# Patient Record
Sex: MALE | Race: WHITE | NOT HISPANIC OR LATINO | Employment: STUDENT | URBAN - METROPOLITAN AREA
[De-identification: names, ages, dates, MRNs, and addresses within clinical notes are randomized per-mention and may not be internally consistent; named-entity substitution may affect disease eponyms.]

---

## 2020-03-30 ENCOUNTER — OFFICE VISIT (OUTPATIENT)
Dept: URGENT CARE | Facility: CLINIC | Age: 25
End: 2020-03-30
Payer: COMMERCIAL

## 2020-03-30 VITALS — TEMPERATURE: 98 F | WEIGHT: 185 LBS | HEART RATE: 112 BPM | OXYGEN SATURATION: 97 %

## 2020-03-30 DIAGNOSIS — R30.0 DYSURIA: ICD-10-CM

## 2020-03-30 DIAGNOSIS — N30.01 ACUTE CYSTITIS WITH HEMATURIA: Primary | ICD-10-CM

## 2020-03-30 LAB
BILIRUB UR QL STRIP: NEGATIVE
GLUCOSE UR QL STRIP: NEGATIVE
KETONES UR QL STRIP: NEGATIVE
LEUKOCYTE ESTERASE UR QL STRIP: POSITIVE
PH, POC UA: 6 (ref 5–8)
POC BLOOD, URINE: POSITIVE
POC NITRATES, URINE: NEGATIVE
PROT UR QL STRIP: POSITIVE
SP GR UR STRIP: 1.02 (ref 1–1.03)
UROBILINOGEN UR STRIP-ACNC: NORMAL (ref 0.3–2.2)

## 2020-03-30 PROCEDURE — 99213 OFFICE O/P EST LOW 20 MIN: CPT | Mod: 25,S$GLB,, | Performed by: NURSE PRACTITIONER

## 2020-03-30 PROCEDURE — 81003 URINALYSIS AUTO W/O SCOPE: CPT | Mod: QW,S$GLB,, | Performed by: NURSE PRACTITIONER

## 2020-03-30 PROCEDURE — 99213 PR OFFICE/OUTPT VISIT, EST, LEVL III, 20-29 MIN: ICD-10-PCS | Mod: 25,S$GLB,, | Performed by: NURSE PRACTITIONER

## 2020-03-30 PROCEDURE — 87491 CHLMYD TRACH DNA AMP PROBE: CPT

## 2020-03-30 PROCEDURE — 81003 POCT URINALYSIS, DIPSTICK, AUTOMATED, W/O SCOPE: ICD-10-PCS | Mod: QW,S$GLB,, | Performed by: NURSE PRACTITIONER

## 2020-03-30 RX ORDER — CIPROFLOXACIN 500 MG/1
500 TABLET ORAL 2 TIMES DAILY
Qty: 20 TABLET | Refills: 0 | Status: SHIPPED | OUTPATIENT
Start: 2020-03-30 | End: 2020-04-09

## 2020-03-30 NOTE — PROGRESS NOTES
Subjective:       Patient ID: Lee Ramirez is a 24 y.o. male.    Vitals:  weight is 83.9 kg (185 lb). His temperature is 98.3 °F (36.8 °C). His pulse is 112 (abnormal). His oxygen saturation is 97%.     Chief Complaint: Urinary Tract Infection    Ambulatory with c/o fever and frequency for few days. He states he was treated for UTI approx. 1 week ago, provider didn't give enough medication so the infection didn't clear. Patient was treated with azithromycin 1 gm. He did not have a urine dip or cultures done and was treated by home visit medic. They did not give injections. He admits to unprotected sex.     Urinary Tract Infection    This is a recurrent problem. The current episode started in the past 7 days. The quality of the pain is described as burning. The pain is at a severity of 7/10. The maximum temperature recorded prior to his arrival was 101 - 101.9 F (101.5). The fever has been present for 1 - 2 days. He is sexually active. There is no history of pyelonephritis. Pertinent negatives include no chills, frequency, hematuria, nausea, urgency, vomiting or rash. Treatments tried: azo.       Constitution: Positive for fever. Negative for chills.   Neck: Negative for painful lymph nodes.   Gastrointestinal: Negative for abdominal pain, nausea and vomiting.   Genitourinary: Positive for dysuria and urine decreased. Negative for frequency, urgency, hematuria, history of kidney stones, genital trauma, painful intercourse, genital sore, penile discharge, painful ejaculation, penile pain, penile swelling, scrotal swelling and testicular pain.   Musculoskeletal: Negative for back pain.   Skin: Negative for rash and lesion.   Hematologic/Lymphatic: Negative for swollen lymph nodes.       Objective:      Physical Exam   Constitutional: He is oriented to person, place, and time. He appears well-developed and well-nourished. No distress.   HENT:   Head: Normocephalic and atraumatic.   Right Ear: External ear normal.    Left Ear: External ear normal.   Nose: Nose normal. No nasal deformity. No epistaxis.   Mouth/Throat: Oropharynx is clear and moist and mucous membranes are normal.   Eyes: Conjunctivae and lids are normal.   Neck: Trachea normal, normal range of motion and phonation normal. Neck supple.   Cardiovascular: Normal rate, regular rhythm, normal heart sounds and intact distal pulses.   Pulmonary/Chest: Effort normal and breath sounds normal.   Abdominal: Soft. Normal appearance and bowel sounds are normal. He exhibits no distension. There is no tenderness. There is no CVA tenderness.   Musculoskeletal: Normal range of motion.   Neurological: He is alert and oriented to person, place, and time. He has normal reflexes.   Skin: Skin is warm, dry, intact and not diaphoretic.   Psychiatric: He has a normal mood and affect. His speech is normal and behavior is normal. Judgment and thought content normal.   Nursing note and vitals reviewed.        Assessment:       1. Acute cystitis with hematuria    2. Dysuria        Plan:         Acute cystitis with hematuria  -     C. trachomatis/N. gonorrhoeae by AMP DNA  -     ciprofloxacin HCl (CIPRO) 500 MG tablet; Take 1 tablet (500 mg total) by mouth 2 (two) times daily. for 10 days  Dispense: 20 tablet; Refill: 0    Dysuria  -     POCT Urinalysis, Dipstick, Automated, W/O Scope  -     C. trachomatis/N. gonorrhoeae by AMP DNA  -     ciprofloxacin HCl (CIPRO) 500 MG tablet; Take 1 tablet (500 mg total) by mouth 2 (two) times daily. for 10 days  Dispense: 20 tablet; Refill: 0

## 2020-03-30 NOTE — PATIENT INSTRUCTIONS
"  Dysuria     Painful urination (dysuria) is often caused by a problem in the urinary tract.   Dysuria is pain felt during urination. It is often described as a burning. Learn more about this problem and how it can be treated.  What causes dysuria?  Possible causes include:  · Infection with a bacteria or virus such as a urinary tract infection (UTI or a sexually transmitted infection (STI)  · Sensitivity or allergy to chemicals such as those found in lotions and other products  · Prostate or bladder problems  · Radiation therapy to the pelvic area  How is dysuria diagnosed?  Your healthcare provider will examine you. He or she will ask about your symptoms and health. After talking with you and doing a physical exam, your healthcare provider may know what is causing your dysuria. He or she will usually request  a sample of your urine. Tests of your urine, or a "urinalysis," are done. A urinalysis may include:  · Looking at the urine sample (visual exam)  · Checking for substances (chemical exam)  · Looking at a small amount under a microscope (microscopic exam)  Some parts of the urinalysis may be done in the provider's office and some in a lab. And, the urine sample may be checked for bacteria and yeast (urine culture). Your healthcare provider will tell you more about these tests if they are needed.  How is dysuria treated?  Treatment depends on the cause. If you have a bacterial infection, you may need antibiotics. You may be given medicines to make it easier for you to urinate and help relieve pain. Your healthcare provider can tell you more about your treatment options. Untreated, symptoms may get worse.  When to call your healthcare provider  Call the healthcare provider right away if you have any of the following:  · Fever of 100.4°F (38°C) or higher   · No improvement after three days of treatment  · Trouble urinating because of pain  · New or increased discharge from the vagina or penis  · Rash or joint " pain  · Increased back or abdominal pain  · Enlarged painful lymph nodes (lumps) in the groin   Date Last Reviewed: 1/1/2017 © 2000-2017 [employer]. [address] [place] [address], [place] [address]. All rights reserved. This information is not intended as a substitute for professional medical care. Always follow your healthcare professional's instructions.        What is Hematuria?  Blood in your urine is a condition known as hematuria. Most of the time, the cause of hematuria is not serious. But, never ignore blood in the urine. Your doctor can evaluate you to find the cause of the bleeding and treat it, if needed.  Types of hematuria  · Gross hematuria means that the blood can be seen by the naked eye. The urine may look pinkish, brownish, or bright red.  · Microscopic hematuria means that the urine is clear, but blood cells can be seen when urine is looked at under a microscope or tested in a lab.  Both types of hematuria can have the same causes. Neither one is necessarily more serious than the other. With either type, you may have other symptoms, such as pain, pressure, or burning when you urinate, abdominal pain, or back pain. Or, you may not have any other symptoms. No matter how much blood is found, the cause of the bleeding needs to be identified.  Finding the cause of hematuria  To evaluate your condition, your doctor will first confirm that blood is indeed present. Then other tests are done to pinpoint where the blood is coming from and why. Your doctor will decide which tests will best determine the cause of your hematuria. Some common tests are listed below.  · History and physical exam  · Lab tests may include urinalysis, a urine culture, a urine cytology, and various blood tests  · Cystoscopy  · Computed tomography (CT) or CT urography  · Magnetic resonance imaging (MRI) or MR urography  · Ultrasound of the kidney  · Kidney biopsy  Causes of hematuria include the very benign (exercised  induced hematuria) to the very severe (cancer of the urinary system). A variety of treatments are available depending on the cause.  Date Last Reviewed: 12/2/2016  © 1195-4115 [employer]. [address] [place] [address], [place] [address]. All rights reserved. This information is not intended as a substitute for professional medical care. Always follow your healthcare professional's instructions.

## 2020-03-31 LAB
C TRACH DNA SPEC QL NAA+PROBE: NOT DETECTED
N GONORRHOEA DNA SPEC QL NAA+PROBE: NOT DETECTED

## 2020-04-03 ENCOUNTER — TELEPHONE (OUTPATIENT)
Dept: URGENT CARE | Facility: CLINIC | Age: 25
End: 2020-04-03

## 2020-04-03 NOTE — TELEPHONE ENCOUNTER
----- Message from Areli Pendleton MD sent at 4/1/2020  9:24 AM CDT -----  Please notify the test for gonorrhea and chlamydia were negative.  Call PCP should symptoms persist

## 2020-06-11 ENCOUNTER — OFFICE VISIT (OUTPATIENT)
Dept: INTERNAL MEDICINE | Facility: CLINIC | Age: 25
End: 2020-06-11
Payer: COMMERCIAL

## 2020-06-11 ENCOUNTER — TELEPHONE (OUTPATIENT)
Dept: INTERNAL MEDICINE | Facility: CLINIC | Age: 25
End: 2020-06-11

## 2020-06-11 VITALS
HEIGHT: 74 IN | OXYGEN SATURATION: 99 % | SYSTOLIC BLOOD PRESSURE: 90 MMHG | DIASTOLIC BLOOD PRESSURE: 60 MMHG | HEART RATE: 89 BPM | WEIGHT: 180 LBS | BODY MASS INDEX: 23.1 KG/M2

## 2020-06-11 DIAGNOSIS — Z00.00 ROUTINE GENERAL MEDICAL EXAMINATION AT A HEALTH CARE FACILITY: Primary | ICD-10-CM

## 2020-06-11 DIAGNOSIS — G40.909 SEIZURE DISORDER: ICD-10-CM

## 2020-06-11 PROCEDURE — 99999 PR PBB SHADOW E&M-EST. PATIENT-LVL III: ICD-10-PCS | Mod: PBBFAC,,, | Performed by: INTERNAL MEDICINE

## 2020-06-11 PROCEDURE — 99999 PR PBB SHADOW E&M-EST. PATIENT-LVL III: CPT | Mod: PBBFAC,,, | Performed by: INTERNAL MEDICINE

## 2020-06-11 PROCEDURE — 99385 PR PREVENTIVE VISIT,NEW,18-39: ICD-10-PCS | Mod: S$GLB,,, | Performed by: INTERNAL MEDICINE

## 2020-06-11 PROCEDURE — 99385 PREV VISIT NEW AGE 18-39: CPT | Mod: S$GLB,,, | Performed by: INTERNAL MEDICINE

## 2020-06-11 RX ORDER — OXCARBAZEPINE 600 MG/1
1200 TABLET, FILM COATED ORAL DAILY
COMMUNITY

## 2020-06-11 NOTE — TELEPHONE ENCOUNTER
----- Message from Donna Mcwilliams MD sent at 6/11/2020  1:36 PM CDT -----  Please co ntact neurology to see if someone can see him for his seizure disorder before he leaves to go back to school on June 23. If not, will need an apt around thanksgiving time  Donna Mcwilliams M.D.

## 2020-06-11 NOTE — PROGRESS NOTES
Chief Complaint: Establish care    HPI: this is a 24 year old man who presents for an initial visit for his annual exam.     He has a history of seizures. Started at age 11 (in 4th grade). Happen only when he forgets to take his medication or if he is sick, dehydration and lack of sleep.  Seizures only occur in his sleep (around 3-5 am).  Rarely has a seizure - 4-5 times a year.  When he has a seizure, he wakes up - he has a twitching of his head and mouth. No loss of consciousness. Lasts less than a minute.  He has trouble talking because the mouth is involved. HE can see and hear.  He has a mild hang over for a couple hours. No bladder or stool incontinence.  He takes Trileptal 600 mg 2 tablets daily for seizure disorder.  Seizures have been less frequent or less intense.     Supposedly the seizures started after swine flu when he was young.     Past Medical History:   Diagnosis Date    Seizures      Past Surgical history - none    Meds and allergies: updated on [facility]    Social history - Never smoked tobacco. Eats mariguana several times a week.  He has a beer a couple times a week.  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  Wants to do biomedical production.     Family History - Mother healthy. Father parkinsonism. Brother and Sister healthy. Maternal grandparents with alcoholism.     Review of Systems:  General: No fever, chills, night sweats, weight loss, fatigue  HEENT: No visual changes, hearing loss, sinus congestion, sore throat, post nasal drip  Resp:  No cough, shortness of breath, dyspnea on exertion  Cardiovascular:  No chest pain, palpitations, orthopnea, PND, edema  GI: No nausea, vomiting, constipation, diarrhea, melana, bloody stools, heartburn  : No dysuria, hematuria  Musculoskeletal: No joint pain, muscle pain  Neuro: No headaches, numbness  Endocrine: No polydipsia, polyuria, hot or cold intolerance.  Heme: No anemia, easy bruising.  Skin: No rashes,  "hairloss.  Psych: No anxiety, depression, or insomnia    Physical exam:  BP 90/60   Pulse 89   Ht 6' 2" (1.88 m)   Wt 81.7 kg (180 lb 0.1 oz)   SpO2 99%   BMI 23.11 kg/m²     General: alert, oriented x 3, no apparent distress.  Affect normal  HEENT: Conjunctivae: anicteric, PERRL, EOMI, TM clear, Oralpharynx clear  Neck: supple, no thyroid enlargement, no cervical lymphadenopathy  Resp: effort normal, lungs clear bilaterally  CV: Regular rate and rhythm without murmurs, gallops or rubs, no lower extremity edema,   Abdomen: soft, non-distended, non-tender, bowel sounds present, no hepatosplenomegaly.  Breasts: No abnormalities seen, no nodules palpated, no axillary lymphadenopathy  Skin: no rashes, good turgur    Assessment/Plan:    Annual exam - discussed diet, exercise and safety issues.    Seizure disorder - neurology referral.     Fasting labs.     Follow up in a year, sooner if problems.       "

## 2020-06-11 NOTE — Clinical Note
Please co ntact neurology to see if someone can see him for his seizure disorder before he leaves to go back to school on June 23. If not, will need an apt around thanksgiving Kervin Mcwilliams M.D.

## 2020-06-12 ENCOUNTER — TELEPHONE (OUTPATIENT)
Dept: NEUROLOGY | Facility: CLINIC | Age: 25
End: 2020-06-12

## 2020-06-12 ENCOUNTER — LAB VISIT (OUTPATIENT)
Dept: LAB | Facility: HOSPITAL | Age: 25
End: 2020-06-12
Attending: INTERNAL MEDICINE
Payer: COMMERCIAL

## 2020-06-12 DIAGNOSIS — Z00.00 ROUTINE GENERAL MEDICAL EXAMINATION AT A HEALTH CARE FACILITY: ICD-10-CM

## 2020-06-12 LAB
25(OH)D3+25(OH)D2 SERPL-MCNC: 29 NG/ML (ref 30–96)
ALBUMIN SERPL BCP-MCNC: 4.5 G/DL (ref 3.5–5.2)
ALP SERPL-CCNC: 65 U/L (ref 55–135)
ALT SERPL W/O P-5'-P-CCNC: 12 U/L (ref 10–44)
ANION GAP SERPL CALC-SCNC: 8 MMOL/L (ref 8–16)
AST SERPL-CCNC: 13 U/L (ref 10–40)
BASOPHILS # BLD AUTO: 0.02 K/UL (ref 0–0.2)
BASOPHILS NFR BLD: 0.5 % (ref 0–1.9)
BILIRUB SERPL-MCNC: 0.5 MG/DL (ref 0.1–1)
BUN SERPL-MCNC: 22 MG/DL (ref 6–20)
CALCIUM SERPL-MCNC: 9.9 MG/DL (ref 8.7–10.5)
CHLORIDE SERPL-SCNC: 103 MMOL/L (ref 95–110)
CHOLEST SERPL-MCNC: 156 MG/DL (ref 120–199)
CHOLEST/HDLC SERPL: 2.6 {RATIO} (ref 2–5)
CO2 SERPL-SCNC: 28 MMOL/L (ref 23–29)
CREAT SERPL-MCNC: 1 MG/DL (ref 0.5–1.4)
DIFFERENTIAL METHOD: ABNORMAL
EOSINOPHIL # BLD AUTO: 0.1 K/UL (ref 0–0.5)
EOSINOPHIL NFR BLD: 1.8 % (ref 0–8)
ERYTHROCYTE [DISTWIDTH] IN BLOOD BY AUTOMATED COUNT: 12 % (ref 11.5–14.5)
EST. GFR  (AFRICAN AMERICAN): >60 ML/MIN/1.73 M^2
EST. GFR  (NON AFRICAN AMERICAN): >60 ML/MIN/1.73 M^2
GLUCOSE SERPL-MCNC: 91 MG/DL (ref 70–110)
HCT VFR BLD AUTO: 40.9 % (ref 40–54)
HDLC SERPL-MCNC: 59 MG/DL (ref 40–75)
HDLC SERPL: 37.8 % (ref 20–50)
HGB BLD-MCNC: 13.7 G/DL (ref 14–18)
IMM GRANULOCYTES # BLD AUTO: 0.01 K/UL (ref 0–0.04)
IMM GRANULOCYTES NFR BLD AUTO: 0.3 % (ref 0–0.5)
LDLC SERPL CALC-MCNC: 87.4 MG/DL (ref 63–159)
LYMPHOCYTES # BLD AUTO: 1.4 K/UL (ref 1–4.8)
LYMPHOCYTES NFR BLD: 36.2 % (ref 18–48)
MCH RBC QN AUTO: 29.3 PG (ref 27–31)
MCHC RBC AUTO-ENTMCNC: 33.5 G/DL (ref 32–36)
MCV RBC AUTO: 88 FL (ref 82–98)
MONOCYTES # BLD AUTO: 0.4 K/UL (ref 0.3–1)
MONOCYTES NFR BLD: 9.1 % (ref 4–15)
NEUTROPHILS # BLD AUTO: 2.1 K/UL (ref 1.8–7.7)
NEUTROPHILS NFR BLD: 52.1 % (ref 38–73)
NONHDLC SERPL-MCNC: 97 MG/DL
NRBC BLD-RTO: 0 /100 WBC
PLATELET # BLD AUTO: 147 K/UL (ref 150–350)
PMV BLD AUTO: 10.8 FL (ref 9.2–12.9)
POTASSIUM SERPL-SCNC: 4.2 MMOL/L (ref 3.5–5.1)
PROT SERPL-MCNC: 7.4 G/DL (ref 6–8.4)
RBC # BLD AUTO: 4.67 M/UL (ref 4.6–6.2)
SODIUM SERPL-SCNC: 139 MMOL/L (ref 136–145)
TRIGL SERPL-MCNC: 48 MG/DL (ref 30–150)
TSH SERPL DL<=0.005 MIU/L-ACNC: 1.51 UIU/ML (ref 0.4–4)
VIT B12 SERPL-MCNC: 740 PG/ML (ref 210–950)
WBC # BLD AUTO: 3.95 K/UL (ref 3.9–12.7)

## 2020-06-12 PROCEDURE — 80061 LIPID PANEL: CPT

## 2020-06-12 PROCEDURE — 80053 COMPREHEN METABOLIC PANEL: CPT

## 2020-06-12 PROCEDURE — 84443 ASSAY THYROID STIM HORMONE: CPT

## 2020-06-12 PROCEDURE — 36415 COLL VENOUS BLD VENIPUNCTURE: CPT

## 2020-06-12 PROCEDURE — 85025 COMPLETE CBC W/AUTO DIFF WBC: CPT

## 2020-06-12 PROCEDURE — 82607 VITAMIN B-12: CPT

## 2020-06-12 PROCEDURE — 82306 VITAMIN D 25 HYDROXY: CPT

## 2020-12-14 ENCOUNTER — OFFICE VISIT (OUTPATIENT)
Dept: OTOLARYNGOLOGY | Facility: CLINIC | Age: 25
End: 2020-12-14
Payer: COMMERCIAL

## 2020-12-14 VITALS
WEIGHT: 185 LBS | TEMPERATURE: 98 F | DIASTOLIC BLOOD PRESSURE: 54 MMHG | HEART RATE: 92 BPM | SYSTOLIC BLOOD PRESSURE: 111 MMHG | BODY MASS INDEX: 23.74 KG/M2 | HEIGHT: 74 IN

## 2020-12-14 DIAGNOSIS — J30.9 ALLERGIC RHINITIS, UNSPECIFIED SEASONALITY, UNSPECIFIED TRIGGER: ICD-10-CM

## 2020-12-14 DIAGNOSIS — H61.21 IMPACTED CERUMEN OF RIGHT EAR: Primary | ICD-10-CM

## 2020-12-14 DIAGNOSIS — R19.6 HALITOSIS: ICD-10-CM

## 2020-12-14 DIAGNOSIS — J35.8 TONSILLITH: ICD-10-CM

## 2020-12-14 DIAGNOSIS — J34.2 NASAL SEPTAL DEVIATION: ICD-10-CM

## 2020-12-14 DIAGNOSIS — H69.93 DYSFUNCTION OF BOTH EUSTACHIAN TUBES: ICD-10-CM

## 2020-12-14 PROBLEM — H61.20 IMPACTED CERUMEN: Status: RESOLVED | Noted: 2020-12-14 | Resolved: 2020-12-14

## 2020-12-14 PROBLEM — H61.20 IMPACTED CERUMEN: Status: ACTIVE | Noted: 2020-12-14

## 2020-12-14 PROCEDURE — 99204 PR OFFICE/OUTPT VISIT, NEW, LEVL IV, 45-59 MIN: ICD-10-PCS | Mod: 25,S$GLB,, | Performed by: SPECIALIST

## 2020-12-14 PROCEDURE — 1126F PR PAIN SEVERITY QUANTIFIED, NO PAIN PRESENT: ICD-10-PCS | Mod: S$GLB,,, | Performed by: SPECIALIST

## 2020-12-14 PROCEDURE — 99204 OFFICE O/P NEW MOD 45 MIN: CPT | Mod: 25,S$GLB,, | Performed by: SPECIALIST

## 2020-12-14 PROCEDURE — 69210 PR REMOVAL IMPACTED CERUMEN REQUIRING INSTRUMENTATION, UNILATERAL: ICD-10-PCS | Mod: S$GLB,,, | Performed by: SPECIALIST

## 2020-12-14 PROCEDURE — 3008F BODY MASS INDEX DOCD: CPT | Mod: CPTII,S$GLB,, | Performed by: SPECIALIST

## 2020-12-14 PROCEDURE — 1126F AMNT PAIN NOTED NONE PRSNT: CPT | Mod: S$GLB,,, | Performed by: SPECIALIST

## 2020-12-14 PROCEDURE — 69210 REMOVE IMPACTED EAR WAX UNI: CPT | Mod: S$GLB,,, | Performed by: SPECIALIST

## 2020-12-14 PROCEDURE — 3008F PR BODY MASS INDEX (BMI) DOCUMENTED: ICD-10-PCS | Mod: CPTII,S$GLB,, | Performed by: SPECIALIST

## 2020-12-14 NOTE — PATIENT INSTRUCTIONS
Tonsillectomy, Post-Op Bleeding  You have had surgery to remove your tonsils. Bleeding at the surgery site can happen after surgery. The doctor can often control it using direct pressure or applying medicine to shrink the blood vessels. If this fails, you will need to return to the operating room for further treatment. Notify your doctor at once or return to this hospital if there are signs of any further bleeding.  Home Care  Your throat will be sore. Throat pain after surgery improves over the first 3-5 days. It is normal to have more pain at the end of the first week before it finally goes away.  Soft foods will be easier to swallow.  Drink plenty of fluids to prevent dehydration. You must continue to drink even though your throat hurts.  For pain relief, suck on ice cubes or frozen fruit pops, eat ice cream or sherbet, and drink cold liquids. You may also use liquid acetaminophen. Avoid taking ibuprofen or aspirin. These may increase bleeding risk. If your doctor has prescribed pain medication, take this as directed.   If antibiotics were prescribed, take these as directed until they are gone.  Do not overheat your home since this dries the air and may irritate throat tissues, especially at night. A cool-mist humidifier in the bedroom may help.  Avoid exposure to people with colds or the flu during the recovery period. You may be more likely to get ill during this time.  Smoking irritates the surgery site. If you smoke, this is a good time to stop.  Avoid any heavy exercise, lifting, or straining for the next 10 days.  Follow-up care  Follow up with your doctor or this facility as advised.  When to see medical advice  Call your doctor right away if any of the following occur:  Trouble speaking, swallowing, or breathing  Nausea and vomiting  Bleeding from the mouth  Fever over 100.4°F (38.3ºC)  Increasing pain not controlled by pain medications  Signs of dehydration: Very dark urine, less urine, dry mouth,  weakness  Date Last Reviewed: 4/20/2015  © 0764-4292 [employer]. [address] [place] [address], [place] [address]. All rights reserved. This information is not intended as a substitute for professional medical care. Always follow your healthcare professional's instructions.        Adult Tonsillectomy  The tonsils are 2 small masses of tissue at the back of the throat. They are part of the bodys immune system. This helps the body fight disease. In some people, the tonsils become infected or enlarged. This can cause severe sore throats, snoring, or other problems. Tonsillectomy is surgery to remove the tonsils. Tonsillectomy may be recommended if you have obstruction causing sleep apnea, or recurring, chronic, or severe infections. This sheet tells you more about this surgery and what to expect.    Preparing for surgery  Prepare as you have been told. Tell your healthcare provider about all medicine you take. This includes over-the-counter drugs. It also includes herbs and other supplements. You may need to stop taking some or all of them before surgery as directed by your healthcare provider. Also, follow any directions youre given for not eating or drinking before surgery.  The day of surgery  The surgery takes about 60 minutes. You will likely go home on the same day.  Before the surgery  Here is what to expect before the surgery begins:  · An IV line is put into a vein in your arm or hand. This line supplies fluids and medicines.  · To keep you free of pain during the surgery, youre given general anesthesia. This medicine puts you into a state like deep sleep through the surgery.  During the surgery  Here is what to expect during the surgery:  · A special device is used to keep the mouth open.  · Other tools are used to remove the tonsils from the back of the throat. The tissue is taken out through the mouth.  · The device holding the mouth open is then removed.  After the surgery  You will be taken to  a recovery room. Healthcare staff will make sure you can drink some liquids. They will also make sure your pain is being managed. When you are ready to leave the hospital, you will need to be driven home by an adult family member or friend.  Recovering at home  It will likely take about 2 weeks to heal from the surgery. During your recovery:  · Expect to have throat pain. You may also feel pain in your ears. This is referred pain from the throat, and is normal. Your post-surgery pain may come and go. It may be worse on the 4th or 5th day after surgery.  · Talk as little as possible, if it is painful.  · Take pain medicine as directed.  · Do not drive while you are on opioid or narcotic pain medicine. Expect to feel sleepy or dizzy while you are taking this medicine.  · Do not use ibuprofen or aspirin for 14 days after surgery unless your healthcare provider says its OK. You may use acetaminophen as directed.  · Use 2 or 3 pillows under your head while resting. This will help keep swelling down.  · Drink lots of chilled liquids. Water, noncitrus juices, and frozen juice bars are good choices.  · Eat cold foods and soft foods, which are easiest to swallow. Try foods like ice cream, gelatin, scrambled eggs, pasta, and mashed potatoes.  · Avoid foods that require a lot of chewing. Also avoid foods that may scratch the throat, like toast or potato chips. Avoid hot, spicy, or acidic foods.  · Avoid strenuous activity for 2 to 3 weeks after surgery.  · Be aware that white patches will form in the throat during healing. These are scabs and are not a sign of infection. The patches will come off in 1 to 2 weeks and may cause bleeding. To minimize bleeding, drink lots of fluids. Gargling with cold water can help.  Call the healthcare provider  Call your healthcare provider if you have any of the following:  · Chest pain or trouble breathing (call 911)  · Fever of 100.4°F (38°C) or higher, or as directed by your healthcare  provider  · Bright red bleeding from the mouth or nose  · Severe pain not relieved by medicine  · Signs of dehydration (dark urine, urinating less often)  · Heavy or persistent bleeding in the throat at any time  · Other signs or symptoms as indicated by your healthcare provider   Follow-up  Schedule a follow-up visit with your healthcare provider as advised. During this visit, the healthcare provider will make sure you are healing well. Ask any questions you have about the surgery or your recovery.  Risks and possible complications   Risks of this surgery include:  · Infection  · Bleeding  · Injury to the lips or teeth  · Painful swallowing during recovery  · The need for a second surgery  · Risks of anesthesia    Date Last Reviewed: 6/11/2015  © 2721-5677 [employer] [address] [place] [address], [place] [address]. All rights reserved. This information is not intended as a substitute for professional medical care. Always follow your healthcare professional's instructions.

## 2020-12-14 NOTE — PROGRESS NOTES
Subjective:       Patient ID: Lee Ramirez is a 25 y.o. male.    Chief Complaint: Right Ear infection/Ear Drum closed (with Throat hair string)    The patient is having multiple problems.  1..  He is having nasal congestion and postnasal drip with some pressure in his ear.  He has past history of spontaneous perforation secondary acute otitis media 1 year ago.  The right ear is completely blocked but not painful.  He is not having any drainage.  He has noticed decreased hearing in the ear.  He has been taking Claritin.  2.  To:  The throat:  He does have some tickling in his throat which is causing him to cough.  He recently cough doubt 40 thought was a piece of hair which cause the tickling to decrease.  3.  He does have cryptic tonsils and produces frequent tonsilliths.  He is not having a sore throat at moment.        Review of Systems     Constitutional: Negative for appetite change, chills, fatigue, fever and unexpected weight loss.      HENT: Positive for postnasal drip, runny nose, sinus pressure, sore throat and stuffy nose.  Negative for ear discharge, ear infection, ear pain, facial swelling, hearing loss, mouth sores, nosebleeds, ringing in the ears, sinus infection, tonsil infection (Cryptic tonsils with tonsilliths), dental problems, trouble swallowing and voice change.      Eyes:  Negative for change in eyesight, eye drainage, eye itching and photophobia.     Respiratory:  Positive for cough. Negative for shortness of breath, sleep apnea, snoring and wheezing.      Cardiovascular:  Negative for chest pain, foot swelling, irregular heartbeat and swollen veins.     Gastrointestinal:  Negative for abdominal pain, acid reflux, constipation, diarrhea, heartburn and vomiting.     Genitourinary: Negative for difficulty urinating, sexual problems and frequent urination.     Musc: Negative for aching joints, aching muscles, back pain and neck pain.     Skin: Negative for rash.     Allergy: Positive for  seasonal allergies. Negative for food allergies.     Endocrine: Negative for cold intolerance and heat intolerance.      Neurological: Positive for headaches. Negative for dizziness, light-headedness, seizures and tremors.      Hematologic: Negative for bruises/bleeds easily.      Psychiatric: Negative for decreased concentration, depression, nervous/anxious and sleep disturbance.                Objective:      Physical Exam  Vitals signs and nursing note reviewed.   Constitutional:       General: He is awake.      Appearance: Normal appearance. He is well-developed, well-groomed and normal weight.   HENT:      Head: Normocephalic.      Jaw: There is normal jaw occlusion.      Salivary Glands: Right salivary gland is not diffusely enlarged. Left salivary gland is not diffusely enlarged.      Right Ear: Hearing, ear canal and external ear normal. There is impacted cerumen. Tympanic membrane is retracted. Tympanic membrane has decreased mobility.      Left Ear: Hearing, ear canal and external ear normal. Tympanic membrane is retracted. Tympanic membrane has decreased mobility.      Nose: Septal deviation (Septum deviated to the right), mucosal edema (with inflamed turbinates bilaterall) and rhinorrhea (Clear mucus bilaterally) present. No nasal deformity.      Right Turbinates: Enlarged and pale. Not swollen.      Left Turbinates: Enlarged. Not swollen.      Mouth/Throat:      Lips: No lesions.      Mouth: No oral lesions.      Dentition: No gum lesions.      Palate: No mass and lesions.      Pharynx: Uvula midline. No oropharyngeal exudate ( ) or posterior oropharyngeal erythema ( Mild).      Tonsils: No tonsillar exudate ( cryptic with no exudate or tonsilloliths). 3+ on the right. 3+ on the left.   Eyes:      General: Lids are normal.         Right eye: No discharge.         Left eye: No discharge.      Conjunctiva/sclera:      Right eye: Right conjunctiva is not injected.      Left eye: Left conjunctiva is not  injected.      Pupils: Pupils are equal, round, and reactive to light.   Neck:      Musculoskeletal: Full passive range of motion without pain, normal range of motion and neck supple. No neck rigidity.      Thyroid: No thyroid mass or thyromegaly.      Vascular: No carotid bruit.      Trachea: Trachea and phonation normal.   Cardiovascular:      Rate and Rhythm: Normal rate and regular rhythm.      Heart sounds: Normal heart sounds.   Pulmonary:      Effort: No respiratory distress.      Breath sounds: Decreased breath sounds ( Diffusely) present. No wheezing, rhonchi or rales.   Abdominal:      General: Bowel sounds are normal.      Palpations: Abdomen is soft.      Tenderness: There is no abdominal tenderness.   Musculoskeletal: Normal range of motion.      Right shoulder: Normal.   Lymphadenopathy:      Head:      Right side of head: No occipital adenopathy.      Left side of head: No occipital adenopathy.      Cervical: Cervical adenopathy present.      Right cervical: Superficial cervical adenopathy and deep cervical adenopathy present. No posterior cervical adenopathy.     Left cervical: Superficial cervical adenopathy and deep cervical adenopathy present. No posterior cervical adenopathy.   Skin:     General: Skin is warm and dry.      Findings: No lesion, petechiae or rash.      Nails: There is no clubbing.     Neurological:      Mental Status: He is alert and oriented to person, place, and time.      Cranial Nerves: No cranial nerve deficit.      Sensory: No sensory deficit.      Gait: Gait normal.   Psychiatric:         Speech: Speech normal.         Behavior: Behavior normal. Behavior is cooperative.         Procedure-cerumen impaction was removed from the right ear using wire loop an 8 Welsh suction.  The patient tolerated procedure well was discharged post procedure      Assessment:       1. Impacted cerumen of right ear    2. Allergic rhinitis, unspecified seasonality, unspecified trigger    3.  Dysfunction of both eustachian tubes    4. Nasal septal deviation    5. Tonsillith    6. Halitosis        Plan:       I am advising patient to use Claritin on an as-needed basis.  He should gargle with warm salt water at least once weekly using 6 oz and gargling once it but it time.  We discuss the options regarding his cryptic tonsils and tonsilliths that are causing halitosis.  He can gargle and use mechanical debridement or have his tonsils removed.  I have been point in that it will require mandatory 2 weeks of down time to recover from the tonsillectomy.  I am giving him an instruction sheet about adult tonsillectomy.  I will recheck him on an as-needed basis.  He will be leaving to return to college in [place] in 3 weeks, so he would not be a candidate to have his tonsils removed now

## 2020-12-15 ENCOUNTER — OFFICE VISIT (OUTPATIENT)
Dept: DERMATOLOGY | Facility: CLINIC | Age: 25
End: 2020-12-15
Payer: COMMERCIAL

## 2020-12-15 DIAGNOSIS — D22.9 NEVUS: Primary | ICD-10-CM

## 2020-12-15 DIAGNOSIS — L72.0 EPIDERMAL INCLUSION CYST: ICD-10-CM

## 2020-12-15 DIAGNOSIS — D48.5 NEOPLASM OF UNCERTAIN BEHAVIOR OF SKIN: ICD-10-CM

## 2020-12-15 PROCEDURE — 88305 TISSUE EXAM BY PATHOLOGIST: CPT | Performed by: PATHOLOGY

## 2020-12-15 PROCEDURE — 99999 PR PBB SHADOW E&M-EST. PATIENT-LVL III: CPT | Mod: PBBFAC,,, | Performed by: DERMATOLOGY

## 2020-12-15 PROCEDURE — 10060 I&D ABSCESS SIMPLE/SINGLE: CPT | Mod: 59,S$GLB,, | Performed by: DERMATOLOGY

## 2020-12-15 PROCEDURE — 88305 TISSUE EXAM BY PATHOLOGIST: CPT | Mod: 26,,, | Performed by: PATHOLOGY

## 2020-12-15 PROCEDURE — 99999 PR PBB SHADOW E&M-EST. PATIENT-LVL III: ICD-10-PCS | Mod: PBBFAC,,, | Performed by: DERMATOLOGY

## 2020-12-15 PROCEDURE — 99202 PR OFFICE/OUTPT VISIT, NEW, LEVL II, 15-29 MIN: ICD-10-PCS | Mod: 25,S$GLB,, | Performed by: DERMATOLOGY

## 2020-12-15 PROCEDURE — 87070 CULTURE OTHR SPECIMN AEROBIC: CPT

## 2020-12-15 PROCEDURE — 12031 INTMD RPR S/A/T/EXT 2.5 CM/<: CPT | Mod: S$GLB,,, | Performed by: DERMATOLOGY

## 2020-12-15 PROCEDURE — 87147 CULTURE TYPE IMMUNOLOGIC: CPT

## 2020-12-15 PROCEDURE — 11402 EXC TR-EXT B9+MARG 1.1-2 CM: CPT | Mod: S$GLB,,, | Performed by: DERMATOLOGY

## 2020-12-15 PROCEDURE — 1126F PR PAIN SEVERITY QUANTIFIED, NO PAIN PRESENT: ICD-10-PCS | Mod: S$GLB,,, | Performed by: DERMATOLOGY

## 2020-12-15 PROCEDURE — 88305 TISSUE EXAM BY PATHOLOGIST: ICD-10-PCS | Mod: 26,,, | Performed by: PATHOLOGY

## 2020-12-15 PROCEDURE — 12031 PR LAYR CLOS WND TRUNK,ARM,LEG <2.5 CM: ICD-10-PCS | Mod: S$GLB,,, | Performed by: DERMATOLOGY

## 2020-12-15 PROCEDURE — 11402 PR EXC SKIN BENIG 1.1-2 CM TRUNK,ARM,LEG: ICD-10-PCS | Mod: S$GLB,,, | Performed by: DERMATOLOGY

## 2020-12-15 PROCEDURE — 1126F AMNT PAIN NOTED NONE PRSNT: CPT | Mod: S$GLB,,, | Performed by: DERMATOLOGY

## 2020-12-15 PROCEDURE — 10060 PR DRAIN SKIN ABSCESS SIMPLE: ICD-10-PCS | Mod: 59,S$GLB,, | Performed by: DERMATOLOGY

## 2020-12-15 PROCEDURE — 99202 OFFICE O/P NEW SF 15 MIN: CPT | Mod: 25,S$GLB,, | Performed by: DERMATOLOGY

## 2020-12-15 RX ORDER — DOXYCYCLINE 100 MG/1
1 TABLET ORAL 2 TIMES DAILY
Qty: 20 TABLET | Refills: 0 | Status: SHIPPED | OUTPATIENT
Start: 2020-12-15 | End: 2020-12-25

## 2020-12-15 NOTE — PROGRESS NOTES
Subjective:       Patient ID:  Lee Ramirez is a 25 y.o. male who presents for   Chief Complaint   Patient presents with    Cyst     posterior right ear lobe    Mole     back     Cyst - Initial  Affected locations: right ear  Duration: 6 months  Signs / symptoms: tender  Severity: mild  Timing: constant  Aggravated by: pressure and friction  Relieving factors/Treatments tried: OTC acne medication (Differin)  Improvement on treatment: mild    Mole - Initial  Affected locations: back  Duration: 5 years  Signs / symptoms: growing and tender  Severity: mild  Timing: recurrent (removed once in the past)  Aggravated by: nothing  Relieving factors/Treatments tried: nothing        Review of Systems   Skin: Positive for activity-related sunscreen use and wears hat. Negative for daily sunscreen use and recent sunburn.   Hematologic/Lymphatic: Does not bruise/bleed easily.        Objective:    Physical Exam   Constitutional: He appears well-developed and well-nourished.   Neurological: He is alert and oriented to person, place, and time.   Psychiatric: He has a normal mood and affect.   Skin:   Areas Examined (abnormalities noted in diagram):   Head / Face Inspection Performed  Back Inspection Performed                   Diagram Legend     Erythematous scaling macule/papule c/w actinic keratosis       Vascular papule c/w angioma      Pigmented verrucoid papule/plaque c/w seborrheic keratosis      Yellow umbilicated papule c/w sebaceous hyperplasia      Irregularly shaped tan macule c/w lentigo     1-2 mm smooth white papules consistent with Milia      Movable subcutaneous cyst with punctum c/w epidermal inclusion cyst      Subcutaneous movable cyst c/w pilar cyst      Firm pink to brown papule c/w dermatofibroma      Pedunculated fleshy papule(s) c/w skin tag(s)      Evenly pigmented macule c/w junctional nevus     Mildly variegated pigmented, slightly irregular-bordered macule c/w mildly atypical nevus      Flesh  colored to evenly pigmented papule c/w intradermal nevus       Pink pearly papule/plaque c/w basal cell carcinoma      Erythematous hyperkeratotic cursted plaque c/w SCC      Surgical scar with no sign of skin cancer recurrence      Open and closed comedones      Inflammatory papules and pustules      Verrucoid papule consistent consistent with wart     Erythematous eczematous patches and plaques     Dystrophic onycholytic nail with subungual debris c/w onychomycosis     Umbilicated papule    Erythematous-base heme-crusted tan verrucoid plaque consistent with inflamed seborrheic keratosis     Erythematous Silvery Scaling Plaque c/w Psoriasis     See annotation              Assessment / Plan:      Pathology Orders:     Normal Orders This Visit    Specimen to Pathology, Dermatology     Comments:    Number of Specimens:->1  ------------------------->-------------------------  Spec 1 Procedure:->Biopsy  Spec 1 Clinical Impression:->r/o irr idn (reoccurance patient  had it previously biopsied)  Spec 1 Source:->Left mid back    Questions:    Procedure Type: Dermatology and skin neoplasms    Number of Specimens: 1    ------------------------: -------------------------    Spec 1 Procedure: Biopsy    Spec 1 Clinical Impression: r/o irr idn (reoccurance patient had it previously biopsied)    Spec 1 Source: Left mid back        Nevus  -     Specimen to Pathology, Dermatology  EXCISION- (DERM-EXCISION)  PLAN NOTES:  CONSENT: The purpose of the excision as well as potential risks were explained to the patient and written informed consent was obtained.   INDICATION and LOCATION:  Left mid back, irritated and reoccurance  PRE-OP LESION SIZE: 1.1 cm  The site was prepped with chlorhexidine 4% and draped in a sterile fashion. Local anesthesia was achieved using lidocaine 1% with epinephrine. A fusiform figure was drawn around the lesion with minimal borders of 4 mm and oriented in a manner appropriate to skin tension lines. An  incision was then made along the marked lines and carried down to subcutaneous tissue. The lesion was excised along this plane. Wound edges were undermined by sharp dissection. Hemostasis was achieved by electrosurgical cautery  Because of wound tension and the size, depth and location of the defect, a layered closure (intermediate repair) was required. This was accomplished with inverted, buried sutures of 4-0 Moncryl. Wound edges were approximated by a running suture of 4-0 Prolene. A sterile dressing was applied. The patient tolerated the procedure well and there were no complications. Blood loss was not significant.  POST-OP SIZE: 2 cm  DISCHARGE INSTRUCTIONS: The patient was instructed to keep the pressure bandage in place for 24 hours.  After 24 hours the area can be cleaned with soap and water.  Apply petrolatum-based ointment and bandage daily until suture removal visit.  The patient was instructed to call the clinic if any signs of infection develop, such as enlarging area of redness, swelling, increasing pain or purulent drainage. The patient was given an appointment to return to clinic for suture removal and pathology report.        Neoplasm of uncertain behavior of skin   as above benign nevus    Epidermal inclusion cyst  -     doxycycline monohydrate 100 mg Tab; Take 1 tablet (100 mg total) by mouth 2 (two) times daily. for 10 days  Dispense: 20 tablet; Refill: 0  -     Aerobic culture  Lesion incised with #11 blade and drained on today's date. Bacterial culture performed.    Patient instructed to perform warm compresses 2 - 3 times/daily.    Will remove on December 23rd after abx are taken               No follow-ups on file.

## 2020-12-15 NOTE — PATIENT INSTRUCTIONS
Doxycycline may cause GI discomfort, esophageal irritation/ulceration, and increased sun sensitivity. Patient was counseled to take medicine with meals and at least 1 hour before lying down.     For ear:  Patient instructed to perform warm compresses 2 - 3 times/daily.     Shave Biopsy Wound Care    Your doctor has performed a shave biopsy today.  A band aid and vaseline ointment has been placed over the site.  This should remain in place for 24 hours.  It is recommended that you keep the area dry for the first 24 hours.  After 24 hours, you may remove the band aid and wash the area with warm soap and water and apply Vaseline jelly.  Many patients prefer to use Neosporin or Bacitracin ointment.  This is acceptable; however, know that you can develop an allergy to this medication even if you have used it safely for years.  It is important to keep the area moist.  Letting it dry out and get air slows healing time, and will worsen the scar.  Band aid is optional after first 24 hours.      If you notice increasing redness, tenderness, pain, or yellow drainage at the biopsy site, please notify your doctor.  These are signs of an infection.    If your biopsy site is bleeding, apply firm pressure for 15 minutes straight.  Repeat for another 15 minutes, if it is still bleeding.   If the surgical site continues to bleed, then please contact your doctor.      For [facility] users:   You will receive your biopsy results in [facility] as soon as they are available. Please be assured that your physician/provider will review your results and will then determine what further treatment, evaluation, or planning is required. You should be contacted by your physician's/provider's office within 5 business days of receiving your results; If not, please reach out to directly. This is one more way [facility] is putting you first.     [address] [facility] [place] [address]/ [phone] FAX/ [email or web address]

## 2020-12-16 NOTE — PROCEDURES
"Ear Cerumen Removal    Date/Time: 12/14/2020 1:30 PM  Performed by: KYRA Macias MD  Authorized by: KYRA Macias MD     Time out: Immediately prior to procedure a "time out" was called to verify the correct patient, procedure, equipment, support staff and site/side marked as required.    Consent Done?:  Yes (Verbal)    Local anesthetic:  None  Location details:  Right ear  Procedure type comment:  Wire loop an 8 Danish suction  Cerumen  Removal Results:  Cerumen completely removed  Patient tolerance:  Patient tolerated the procedure well with no immediate complications      "

## 2020-12-17 LAB
BACTERIA SPEC AEROBE CULT: ABNORMAL
FINAL PATHOLOGIC DIAGNOSIS: NORMAL
GROSS: NORMAL
MICROSCOPIC EXAM: NORMAL

## 2020-12-21 DIAGNOSIS — L72.0 EPIDERMAL INCLUSION CYST: Primary | ICD-10-CM

## 2020-12-21 RX ORDER — AMOXICILLIN 500 MG/1
500 CAPSULE ORAL EVERY 12 HOURS
Qty: 20 CAPSULE | Refills: 0 | Status: SHIPPED | OUTPATIENT
Start: 2020-12-21 | End: 2020-12-31

## 2020-12-23 ENCOUNTER — OFFICE VISIT (OUTPATIENT)
Dept: DERMATOLOGY | Facility: CLINIC | Age: 25
End: 2020-12-23
Payer: COMMERCIAL

## 2020-12-23 DIAGNOSIS — L72.0 EPIDERMAL INCLUSION CYST: Primary | ICD-10-CM

## 2020-12-23 DIAGNOSIS — L90.5 SCAR: ICD-10-CM

## 2020-12-23 PROCEDURE — 88341 IMHCHEM/IMCYTCHM EA ADD ANTB: CPT | Mod: 26,,, | Performed by: PATHOLOGY

## 2020-12-23 PROCEDURE — 11442 PR EXC SKIN BENIG 1.1-2 CM FACE,FACIAL: ICD-10-PCS | Mod: 79,S$GLB,, | Performed by: DERMATOLOGY

## 2020-12-23 PROCEDURE — 88304 TISSUE EXAM BY PATHOLOGIST: CPT | Mod: 26,,, | Performed by: PATHOLOGY

## 2020-12-23 PROCEDURE — 99999 PR PBB SHADOW E&M-EST. PATIENT-LVL II: ICD-10-PCS | Mod: PBBFAC,,, | Performed by: DERMATOLOGY

## 2020-12-23 PROCEDURE — 99999 PR PBB SHADOW E&M-EST. PATIENT-LVL II: CPT | Mod: PBBFAC,,, | Performed by: DERMATOLOGY

## 2020-12-23 PROCEDURE — 11442 EXC FACE-MM B9+MARG 1.1-2 CM: CPT | Mod: 79,S$GLB,, | Performed by: DERMATOLOGY

## 2020-12-23 PROCEDURE — 88342 CHG IMMUNOCYTOCHEMISTRY: ICD-10-PCS | Mod: 26,,, | Performed by: PATHOLOGY

## 2020-12-23 PROCEDURE — 88341 IMHCHEM/IMCYTCHM EA ADD ANTB: CPT | Performed by: PATHOLOGY

## 2020-12-23 PROCEDURE — 88342 IMHCHEM/IMCYTCHM 1ST ANTB: CPT | Performed by: PATHOLOGY

## 2020-12-23 PROCEDURE — 88342 IMHCHEM/IMCYTCHM 1ST ANTB: CPT | Mod: 26,,, | Performed by: PATHOLOGY

## 2020-12-23 PROCEDURE — 88304 TISSUE EXAM BY PATHOLOGIST: CPT | Performed by: PATHOLOGY

## 2020-12-23 PROCEDURE — 88341 PR IHC OR ICC EACH ADD'L SINGLE ANTIBODY  STAINPR: ICD-10-PCS | Mod: 26,,, | Performed by: PATHOLOGY

## 2020-12-23 PROCEDURE — 99499 UNLISTED E&M SERVICE: CPT | Mod: S$GLB,,, | Performed by: DERMATOLOGY

## 2020-12-23 PROCEDURE — 88304 PR  SURG PATH,LEVEL III: ICD-10-PCS | Mod: 26,,, | Performed by: PATHOLOGY

## 2020-12-23 PROCEDURE — 99499 NO LOS: ICD-10-PCS | Mod: S$GLB,,, | Performed by: DERMATOLOGY

## 2020-12-23 NOTE — PROGRESS NOTES
Subjective:       Patient ID:  Lee Ramirez is a 25 y.o. male who presents for   Chief Complaint   Patient presents with    Cyst     ear      Cyst - Follow-up  Symptom course: unchanged  Affected locations: right ear  Signs / symptoms: asymptomatic  Severity: mild        Review of Systems   Constitutional: Negative for fever, chills and fatigue.   Skin: Positive for daily sunscreen use and wears hat. Negative for recent sunburn.   Hematologic/Lymphatic: Does not bruise/bleed easily.        Objective:    Physical Exam   Constitutional: He appears well-developed and well-nourished.   Neurological: He is alert and oriented to person, place, and time.   Psychiatric: He has a normal mood and affect.   Skin:   Areas Examined (abnormalities noted in diagram):   Head / Face Inspection Performed             Diagram Legend     Erythematous scaling macule/papule c/w actinic keratosis       Vascular papule c/w angioma      Pigmented verrucoid papule/plaque c/w seborrheic keratosis      Yellow umbilicated papule c/w sebaceous hyperplasia      Irregularly shaped tan macule c/w lentigo     1-2 mm smooth white papules consistent with Milia      Movable subcutaneous cyst with punctum c/w epidermal inclusion cyst      Subcutaneous movable cyst c/w pilar cyst      Firm pink to brown papule c/w dermatofibroma      Pedunculated fleshy papule(s) c/w skin tag(s)      Evenly pigmented macule c/w junctional nevus     Mildly variegated pigmented, slightly irregular-bordered macule c/w mildly atypical nevus      Flesh colored to evenly pigmented papule c/w intradermal nevus       Pink pearly papule/plaque c/w basal cell carcinoma      Erythematous hyperkeratotic cursted plaque c/w SCC      Surgical scar with no sign of skin cancer recurrence      Open and closed comedones      Inflammatory papules and pustules      Verrucoid papule consistent consistent with wart     Erythematous eczematous patches and plaques     Dystrophic  onycholytic nail with subungual debris c/w onychomycosis     Umbilicated papule    Erythematous-base heme-crusted tan verrucoid plaque consistent with inflamed seborrheic keratosis     Erythematous Silvery Scaling Plaque c/w Psoriasis     See annotation      Assessment / Plan:      Pathology Orders:     Normal Orders This Visit    Specimen to Pathology, Dermatology     Questions:    Procedure Type: Dermatology and skin neoplasms    Number of Specimens: 1    ------------------------: -------------------------    Spec 1 Procedure: Biopsy    Spec 1 Clinical Impression: r/o cyst vs. congealed blood vs. cyst contents with gran inflam    Spec 1 Source: right posterior ear lobe        Epidermal inclusion cyst  -     Specimen to Pathology, Dermatology  EXCISION- CYST     CONSENT: The purpose of the excision as well as potential risks were explained to the patient and written informed consent was obtained.      INDICATION and LOCATION: Likely epidermal inclusion cyst, right posterior ear lobe    PRE-OP LESION SIZE: 1.0 cm x 1.1cm    PROCEDURE:The site was prepped with isopropyl alcohol. Local anesthesia was achieved using lidocaine 1% with epinephrine for a total of 6 cc. A fusiform figure was drawn around the cyst and oriented in a manner appropriate to skin tension lines. An incision was then made along the marked lines and carried down to subcutaneous tissue. The cyst was dissected free from surrounding subcutaneous tissue and submitted to pathology for examination. Wound edges were undermined by sharp dissection. Hemostasis was achieved by electrocautery and Monsel's solution.  A sterile pressure dressing was applied. The patient tolerated the procedure well, and there were no complications. Blood loss was not significant.     POST-OP SIZE:  1.1 cm    DISCHARGE INSTRUCTIONS: The patient was instructed to keep the pressure bandage in place for 24 hours.  After 24 hours the area can be cleaned with soap and water.  Apply  petrolatum-based ointment and bandage daily until suture removal visit.  The patient was instructed to call the clinic if any signs of infection develop, such as enlarging area of redness, swelling, increasing pain or purulent drainage. The patient was given an appointment to return to clinic for suture removal and pathology report.     Scar  - reassurance will f/u in 1 week for removal, lesion was a benign congential nevus, continue band-aid/vaseline           No follow-ups on file.

## 2020-12-23 NOTE — PATIENT INSTRUCTIONS

## 2020-12-31 LAB
COMMENT: NORMAL
FINAL PATHOLOGIC DIAGNOSIS: NORMAL
GROSS: NORMAL
MICROSCOPIC EXAM: NORMAL

## 2021-07-06 ENCOUNTER — PATIENT MESSAGE (OUTPATIENT)
Dept: ADMINISTRATIVE | Facility: HOSPITAL | Age: 26
End: 2021-07-06

## 2021-10-04 ENCOUNTER — PATIENT MESSAGE (OUTPATIENT)
Dept: ADMINISTRATIVE | Facility: HOSPITAL | Age: 26
End: 2021-10-04

## 2022-03-16 ENCOUNTER — PATIENT MESSAGE (OUTPATIENT)
Dept: ADMINISTRATIVE | Facility: HOSPITAL | Age: 27
End: 2022-03-16
Payer: COMMERCIAL